# Patient Record
Sex: MALE | Race: WHITE
[De-identification: names, ages, dates, MRNs, and addresses within clinical notes are randomized per-mention and may not be internally consistent; named-entity substitution may affect disease eponyms.]

---

## 2018-04-30 ENCOUNTER — HOSPITAL ENCOUNTER (OUTPATIENT)
Dept: HOSPITAL 17 - NEPE | Age: 63
Setting detail: OBSERVATION
LOS: 1 days | Discharge: HOME | End: 2018-05-01
Attending: INTERNAL MEDICINE | Admitting: INTERNAL MEDICINE
Payer: COMMERCIAL

## 2018-04-30 VITALS
RESPIRATION RATE: 17 BRPM | DIASTOLIC BLOOD PRESSURE: 61 MMHG | HEART RATE: 70 BPM | SYSTOLIC BLOOD PRESSURE: 112 MMHG | OXYGEN SATURATION: 95 %

## 2018-04-30 VITALS — OXYGEN SATURATION: 97 %

## 2018-04-30 VITALS
HEART RATE: 68 BPM | SYSTOLIC BLOOD PRESSURE: 158 MMHG | DIASTOLIC BLOOD PRESSURE: 57 MMHG | OXYGEN SATURATION: 96 % | RESPIRATION RATE: 21 BRPM

## 2018-04-30 VITALS
SYSTOLIC BLOOD PRESSURE: 136 MMHG | OXYGEN SATURATION: 98 % | RESPIRATION RATE: 15 BRPM | TEMPERATURE: 97.4 F | DIASTOLIC BLOOD PRESSURE: 80 MMHG | HEART RATE: 65 BPM

## 2018-04-30 VITALS
OXYGEN SATURATION: 96 % | TEMPERATURE: 97.6 F | HEART RATE: 72 BPM | RESPIRATION RATE: 22 BRPM | SYSTOLIC BLOOD PRESSURE: 104 MMHG | DIASTOLIC BLOOD PRESSURE: 62 MMHG

## 2018-04-30 VITALS
RESPIRATION RATE: 24 BRPM | HEART RATE: 72 BPM | DIASTOLIC BLOOD PRESSURE: 67 MMHG | OXYGEN SATURATION: 94 % | SYSTOLIC BLOOD PRESSURE: 125 MMHG

## 2018-04-30 VITALS — WEIGHT: 216.05 LBS | BODY MASS INDEX: 26.86 KG/M2 | HEIGHT: 75 IN

## 2018-04-30 VITALS
OXYGEN SATURATION: 96 % | HEART RATE: 63 BPM | DIASTOLIC BLOOD PRESSURE: 58 MMHG | TEMPERATURE: 98.3 F | SYSTOLIC BLOOD PRESSURE: 108 MMHG | RESPIRATION RATE: 15 BRPM

## 2018-04-30 VITALS — OXYGEN SATURATION: 94 %

## 2018-04-30 VITALS
DIASTOLIC BLOOD PRESSURE: 66 MMHG | HEART RATE: 73 BPM | OXYGEN SATURATION: 94 % | SYSTOLIC BLOOD PRESSURE: 125 MMHG | RESPIRATION RATE: 20 BRPM

## 2018-04-30 VITALS
HEART RATE: 70 BPM | DIASTOLIC BLOOD PRESSURE: 72 MMHG | SYSTOLIC BLOOD PRESSURE: 139 MMHG | OXYGEN SATURATION: 96 % | RESPIRATION RATE: 15 BRPM

## 2018-04-30 VITALS — HEART RATE: 65 BPM

## 2018-04-30 VITALS — HEART RATE: 60 BPM

## 2018-04-30 VITALS
TEMPERATURE: 97.9 F | RESPIRATION RATE: 24 BRPM | SYSTOLIC BLOOD PRESSURE: 123 MMHG | DIASTOLIC BLOOD PRESSURE: 73 MMHG | OXYGEN SATURATION: 97 % | HEART RATE: 66 BPM

## 2018-04-30 VITALS — OXYGEN SATURATION: 98 %

## 2018-04-30 DIAGNOSIS — E11.40: ICD-10-CM

## 2018-04-30 DIAGNOSIS — R07.89: Primary | ICD-10-CM

## 2018-04-30 DIAGNOSIS — J45.909: ICD-10-CM

## 2018-04-30 DIAGNOSIS — Z79.02: ICD-10-CM

## 2018-04-30 DIAGNOSIS — K76.0: ICD-10-CM

## 2018-04-30 DIAGNOSIS — I25.2: ICD-10-CM

## 2018-04-30 DIAGNOSIS — K21.9: ICD-10-CM

## 2018-04-30 DIAGNOSIS — I25.10: ICD-10-CM

## 2018-04-30 DIAGNOSIS — E78.5: ICD-10-CM

## 2018-04-30 DIAGNOSIS — Z95.5: ICD-10-CM

## 2018-04-30 DIAGNOSIS — Z87.891: ICD-10-CM

## 2018-04-30 DIAGNOSIS — Z82.49: ICD-10-CM

## 2018-04-30 DIAGNOSIS — Z85.828: ICD-10-CM

## 2018-04-30 DIAGNOSIS — Z79.84: ICD-10-CM

## 2018-04-30 DIAGNOSIS — E03.9: ICD-10-CM

## 2018-04-30 DIAGNOSIS — M19.90: ICD-10-CM

## 2018-04-30 DIAGNOSIS — Z79.899: ICD-10-CM

## 2018-04-30 DIAGNOSIS — I10: ICD-10-CM

## 2018-04-30 DIAGNOSIS — I44.4: ICD-10-CM

## 2018-04-30 LAB
ALBUMIN SERPL-MCNC: 4 GM/DL (ref 3.4–5)
ALP SERPL-CCNC: 55 U/L (ref 45–117)
ALT SERPL-CCNC: 34 U/L (ref 12–78)
AST SERPL-CCNC: 23 U/L (ref 15–37)
BASOPHILS # BLD AUTO: 0.1 TH/MM3 (ref 0–0.2)
BASOPHILS NFR BLD: 0.8 % (ref 0–2)
BILIRUB SERPL-MCNC: 0.5 MG/DL (ref 0.2–1)
BUN SERPL-MCNC: 26 MG/DL (ref 7–18)
CALCIUM SERPL-MCNC: 9.1 MG/DL (ref 8.5–10.1)
CHLORIDE SERPL-SCNC: 108 MEQ/L (ref 98–107)
CREAT SERPL-MCNC: 1.39 MG/DL (ref 0.6–1.3)
EOSINOPHIL # BLD: 0.3 TH/MM3 (ref 0–0.4)
EOSINOPHIL NFR BLD: 3.5 % (ref 0–4)
ERYTHROCYTE [DISTWIDTH] IN BLOOD BY AUTOMATED COUNT: 13.8 % (ref 11.6–17.2)
GFR SERPLBLD BASED ON 1.73 SQ M-ARVRAT: 52 ML/MIN (ref 89–?)
GLUCOSE SERPL-MCNC: 220 MG/DL (ref 74–106)
HCO3 BLD-SCNC: 23.4 MEQ/L (ref 21–32)
HCT VFR BLD CALC: 45.4 % (ref 39–51)
HGB BLD-MCNC: 15.4 GM/DL (ref 13–17)
INR PPP: 1.1 RATIO
LYMPHOCYTES # BLD AUTO: 2 TH/MM3 (ref 1–4.8)
LYMPHOCYTES NFR BLD AUTO: 27 % (ref 9–44)
MAGNESIUM SERPL-MCNC: 2.2 MG/DL (ref 1.5–2.5)
MCH RBC QN AUTO: 31.3 PG (ref 27–34)
MCHC RBC AUTO-ENTMCNC: 34 % (ref 32–36)
MCV RBC AUTO: 92.1 FL (ref 80–100)
MONOCYTE #: 0.6 TH/MM3 (ref 0–0.9)
MONOCYTES NFR BLD: 8.9 % (ref 0–8)
NEUTROPHILS # BLD AUTO: 4.3 TH/MM3 (ref 1.8–7.7)
NEUTROPHILS NFR BLD AUTO: 59.8 % (ref 16–70)
PLATELET # BLD: 160 TH/MM3 (ref 150–450)
PMV BLD AUTO: 8.3 FL (ref 7–11)
PROT SERPL-MCNC: 7.7 GM/DL (ref 6.4–8.2)
PROTHROMBIN TIME: 10.8 SEC (ref 9.8–11.6)
RBC # BLD AUTO: 4.93 MIL/MM3 (ref 4.5–5.9)
SODIUM SERPL-SCNC: 141 MEQ/L (ref 136–145)
TROPONIN I SERPL-MCNC: (no result) NG/ML (ref 0.02–0.05)
WBC # BLD AUTO: 7.2 TH/MM3 (ref 4–11)

## 2018-04-30 PROCEDURE — 85730 THROMBOPLASTIN TIME PARTIAL: CPT

## 2018-04-30 PROCEDURE — 93017 CV STRESS TEST TRACING ONLY: CPT

## 2018-04-30 PROCEDURE — 82948 REAGENT STRIP/BLOOD GLUCOSE: CPT

## 2018-04-30 PROCEDURE — 80053 COMPREHEN METABOLIC PANEL: CPT

## 2018-04-30 PROCEDURE — A9502 TC99M TETROFOSMIN: HCPCS

## 2018-04-30 PROCEDURE — 85610 PROTHROMBIN TIME: CPT

## 2018-04-30 PROCEDURE — 78452 HT MUSCLE IMAGE SPECT MULT: CPT

## 2018-04-30 PROCEDURE — 71046 X-RAY EXAM CHEST 2 VIEWS: CPT

## 2018-04-30 PROCEDURE — G0378 HOSPITAL OBSERVATION PER HR: HCPCS

## 2018-04-30 PROCEDURE — 93005 ELECTROCARDIOGRAM TRACING: CPT

## 2018-04-30 PROCEDURE — 83690 ASSAY OF LIPASE: CPT

## 2018-04-30 PROCEDURE — 85025 COMPLETE CBC W/AUTO DIFF WBC: CPT

## 2018-04-30 PROCEDURE — 99285 EMERGENCY DEPT VISIT HI MDM: CPT

## 2018-04-30 PROCEDURE — 83735 ASSAY OF MAGNESIUM: CPT

## 2018-04-30 PROCEDURE — 84484 ASSAY OF TROPONIN QUANT: CPT

## 2018-04-30 PROCEDURE — 82550 ASSAY OF CK (CPK): CPT

## 2018-04-30 RX ADMIN — LOSARTAN POTASSIUM SCH MG: 25 TABLET, FILM COATED ORAL at 21:07

## 2018-04-30 RX ADMIN — GABAPENTIN SCH MG: 300 CAPSULE ORAL at 21:07

## 2018-04-30 RX ADMIN — INSULIN ASPART SCH: 100 INJECTION, SOLUTION INTRAVENOUS; SUBCUTANEOUS at 21:14

## 2018-04-30 RX ADMIN — METOPROLOL TARTRATE SCH MG: 25 TABLET, FILM COATED ORAL at 16:41

## 2018-04-30 RX ADMIN — Medication SCH ML: at 21:07

## 2018-04-30 RX ADMIN — INSULIN ASPART SCH: 100 INJECTION, SOLUTION INTRAVENOUS; SUBCUTANEOUS at 17:00

## 2018-04-30 RX ADMIN — NITROGLYCERIN SCH INCH: 20 OINTMENT TOPICAL at 18:00

## 2018-04-30 RX ADMIN — METOPROLOL TARTRATE SCH MG: 25 TABLET, FILM COATED ORAL at 21:00

## 2018-04-30 RX ADMIN — NITROGLYCERIN SCH INCH: 20 OINTMENT TOPICAL at 16:41

## 2018-04-30 NOTE — EKG
Date Performed: 04/30/2018       Time Performed: 16:44:32

 

PTAGE:      63 years

 

EKG:      Sinus rhythm 

 

 WITH SINUS ARRHYTHMIA MARKED LEFT AXIS DEVIATION LATERAL MYOCARDIAL INFARCTION ABNORMAL ECG Compared
 to prior electrocardiogram, Ectopic atrial rhythm no longer present. 

 

 PREVIOUS TRACING            : 04/30/2018 14.25

 

DOCTOR:   Felipe Valdez  Interpretating Date/Time  04/30/2018 17:28:21

## 2018-04-30 NOTE — EKG
Date Performed: 04/30/2018       Time Performed: 10:23:13

 

PTAGE:      63 years

 

EKG:      Sinus rhythm 

 

 WITH OCCASIONAL SUPRAVENTRICULAR PREMATURE COMPLEXES LEFT ANTERIOR FASCICULAR BLOCK ANTEROLATERAL MY
OCARDIAL INFARCTION ABNORMAL ECG INTERPRETATION BASED ON A DEFAULT AGE OF 40 YEARS 

 

 PREVIOUS TRACING            : 08/13/2014 07.03 Since the previous tracing, no significant change not
ed

 

DOCTOR:   Bryan Morris  Interpretating Date/Time  04/30/2018 20:33:54

## 2018-04-30 NOTE — PD
HPI


Chief Complaint:  Chest Pain


Time Seen by Provider:  10:28


Travel History


International Travel<30 days:  No


Contact w/Intl Traveler<30days:  No


Traveled to known affect area:  No





History of Present Illness


HPI


The patient 63 years old and arrives to the ER with a complaint of chest pain.  

It started about 5 minutes after he woke up.  Location is retrosternal and 

epigastric.  At its worst the pain was 6/10.  In the ER it is almost completely 

gone.  Patient reports a history of myocardial infarction 2, in 2001 and 2005.

  He reports 6/10 pain severity.  He reports taking Plavix daily and an aspirin 

allergy.  He reports no shortness of breath.  There is some radiation to the 

head into the back.  He states it feels similar to his second MI.  He denies 

any travel recently.  The patient works 7 PM to 7 AM and just finished 3 nights 

in a row.  Positive history of myocardial infarction involving father and 

brother.





PFSH


Past Medical History


Hx Anticoagulant Therapy:  Yes (PLAVIX)


Arthritis:  Yes


Asthma:  Yes


Blood Disorders:  No


Anxiety:  No


Depression:  No


Heart Rhythm Problems:  No


Cancer:  Yes (SKIN)


Cardiac Catheterization:  Yes


Cardiovascular Problems:  Yes


High Cholesterol:  Yes


Chemotherapy:  No


Chest Pain:  Yes


Congestive Heart Failure:  No


Coronary Artery Disease:  Yes


Diabetes:  Yes


Patient Takes Glucophage:  Yes


Diminished Hearing:  No


Diverticulitis:  Yes


Endocrine:  Yes


Gastrointestinal Disorders:  Yes (POSSIBLE GERD, INDIGESTION D/T GALLBLADDER, 

NAUSEA)


GERD:  Yes


Glaucoma:  No


Gout:  Yes


Genitourinary:  No


Hepatitis:  No


Hiatal Hernia:  No


Hypertension:  Yes


Immune Disorder:  No


Medical other:  Yes (FATTY LIVER)


Musculoskeletal:  Yes (ARTHRITIS, SEES CHIROPRACTOR FOR BACK ISSUES)


Neurologic:  Yes (NEUROPATHY BHANU FEET)


Psychiatric:  No


Reproductive:  No


Respiratory:  Yes


Integumentary:  No


Myocardial Infarction:  Yes


Radiation Therapy:  No


Thyroid Disease:  Yes





Past Surgical History


Abdominal Surgery:  Yes (LIVER BIOPSY)


AICD:  No


Body Medical Devices:  CARDIAC STENTS X6


Cardiac Surgery:  Yes (STENTS PLACED 2001,2005,2008)


Cholecystectomy:  Yes


Coronary Artery Bypass Graft:  No


Coronary Stent:  Yes (X6)


Ear Surgery:  No


Endocrine Surgery:  No


Eye Surgery:  No


Genitourinary Surgery:  No


Gynecologic Surgery:  No


Joint Replacement:  No


Neurologic Surgery:  Yes (COLONOSCOPY ; EGD X 2)


Oral Surgery:  No


Pacemaker:  No


Thoracic Surgery:  No


Other Surgery:  Yes (ARTHROSCOPY RIGHT KNEE, LIVER BIOPSY, 6 CARDIAC STENTS)





Family History


Family Myocardial Infarction:  Yes (father)





Social History


Alcohol Use:  Yes (OCCASSIONAL)


Tobacco Use:  No


Substance Use:  No





Allergies-Medications


(Allergen,Severity, Reaction):  


Coded Allergies:  


     diclofenac (Unverified  Allergy, Severe, FACIAL SWELLING, 4/30/18)


     etodolac (Unverified  Allergy, Severe, FACIAL SWELLING, 4/30/18)


     flurbiprofen (Unverified  Allergy, Severe, FACIAL SWELLING, 4/30/18)


     ibuprofen (Unverified  Allergy, Severe, FACIAL SWELLING, 4/30/18)


     indomethacin (Unverified  Allergy, Severe, FACIAL SWELLING, 4/30/18)


     ketoprofen (Unverified  Allergy, Severe, FACIAL SWELLING, 4/30/18)


     ketorolac (Unverified  Allergy, Severe, FACIAL SWELLING, 4/30/18)


     naproxen (Unverified  Allergy, Severe, FACIAL SWELLING, 4/30/18)


     oxaprozin (Unverified  Allergy, Severe, FACIAL SWELLING, 4/30/18)


     penicillin G (Unverified  Allergy, Severe, MOUTH AND THROAT SORES, 4/30/18)


Reported Meds & Prescriptions





Reported Meds & Active Scripts


Active


Reported


Osteo Bi-Flex One A Day (Boswellia-Glucosamine-Vitamin) 1 Tab 1 Tab PO DAILY


Centrum (Multiple Vitamins W/ Minerals) 1 Chew 1 Tab CHEW HS


Tresiba Flextouch Pen Inj (Insulin Degludec Inj) 300 unit/3 ML Pen 40 Units SQ 

AS DIRECTED


Rosuvastatin (Rosuvastatin Calcium) 40 Mg Tab 40 Mg PO HS


Pantoprazole (Pantoprazole Sodium) 40 Mg Tab 40 Mg PO DAILY


Metformin (Metformin HCl) 1,000 Mg Tab 1,000 Mg PO BIDPC


Losartan (Losartan Potassium) 25 Mg Tab 25 Mg PO BID


Levothyroxine (Levothyroxine Sodium) 75 Mcg Tab 75 Mcg PO DAILY


Gabapentin 300 Mg Cap 300 Mg PO BID


Folic Acid 1 Mg Tablet 1 Mg PO DAILY


Farxiga (Dapagliflozin) 5 Mg Tab 5 Mg PO DAILY


Plavix (Clopidogrel Bisulfate) 75 Mg Tab 75 Mg PO HS








Review of Systems


Except as stated in HPI:  all other systems reviewed are Neg


General / Constitutional:  No: Fever


Eyes:  No: Photophobia





Physical Exam


Narrative


GENERAL: 63-year-old male well-nourished well-developed no acute distress





Vital Signs








  Date Time  Temp Pulse Resp B/P (MAP) Pulse Ox O2 Delivery O2 Flow Rate FiO2


 


4/30/18 10:34  73 20 125/67 (86) 94 Room Air  





    119/66 (83)    


 


4/30/18 10:33  72 24 125/67 (86) 94 Room Air  


 


4/30/18 10:32     94 Room Air  


 


4/30/18 10:32      Nasal Cannula 2.00 


 


4/30/18 10:12 97.6 72 22 104/62 (76) 96   








SKIN: Warm and dry.


HEAD: Atraumatic. Normocephalic. 


EYES: Pupils equal and round. No scleral icterus. No injection or drainage. 


ENT: No nasal bleeding or discharge.  Mucous membranes pink and moist.


NECK: Trachea midline. No JVD. 


CARDIOVASCULAR: Regular rate and rhythm.  


RESPIRATORY: No accessory muscle use. Clear to auscultation. Breath sounds 

equal bilaterally. 


GASTROINTESTINAL: Abdomen soft, non-tender, nondistended. Hepatic and splenic 

margins not palpable. 


MUSCULOSKELETAL: Extremities without clubbing, cyanosis, or edema. No obvious 

deformities. 


NEUROLOGICAL: Awake and alert. No obvious cranial nerve deficits.  Motor 

grossly within normal limits. Five out of 5 muscle strength in the arms and 

legs.  Normal speech.


PSYCHIATRIC: Appropriate mood and affect; insight and judgment normal.





Data


Data


Last Documented VS





Vital Signs








  Date Time  Temp Pulse Resp B/P (MAP) Pulse Ox O2 Delivery O2 Flow Rate FiO2


 


4/30/18 10:34  73 20 125/67 (86) 94 Room Air  





    119/66 (83)    


 


4/30/18 10:32       2.00 


 


4/30/18 10:12 97.6       








Orders





 Orders


Electrocardiogram (4/30/18 10:28)


Ckmb (Isoenzyme) Profile (4/30/18 10:28)


Complete Blood Count With Diff (4/30/18 10:28)


Comprehensive Metabolic Panel (4/30/18 10:28)


Magnesium (Mg) (4/30/18 10:28)


Prothrombin Time / Inr (Pt) (4/30/18 10:28)


Act Partial Throm Time (Ptt) (4/30/18 10:28)


Troponin I (4/30/18 10:28)


Lipase (4/30/18 10:28)


Ecg Monitoring (4/30/18 10:28)


Bilateral Bp Monitoring (4/30/18 10:28)


Iv Access Insert/Monitor (4/30/18 10:28)


Oximetry (4/30/18 10:28)


Oxygen Administration (4/30/18 10:28)


Sodium Chloride 0.9% Flush (Ns Flush) (4/30/18 10:30)


Chest, Pa & Lat (4/30/18 10:28)


Activity Bed Rest With Brp (4/30/18 12:08)


Vital Signs (Adult) Q4H (4/30/18 12:08)


Cardiac Rhythm .As Directed (4/30/18 12:08)


Notify Dr: Other .PRN (4/30/18 12:08)


Notify Dr. Parameters (4/30/18 12:08)


Resp Oxygen Nasal Cannula (4/30/18 )


Diet Npo (4/30/18 Lunch)


Ckmb (Isoenzyme) Profile (4/30/18 12:08)


Ckmb (Isoenzyme) Profile (4/30/18 15:08)


Troponin I (4/30/18 12:08)


Troponin I (4/30/18 15:08)


Electrocardiogram (4/30/18 12:08)


Electrocardiogram (4/30/18 15:08)


^ Obtain (4/30/18 12:08)


Sodium Chloride 0.9% Flush (Ns Flush) (4/30/18 12:15)


Sodium Chloride 0.9% Flush (Ns Flush) (4/30/18 21:00)


Acetaminophen (Tylenol) (4/30/18 12:15)


Acetamin-Hydrocod 325-7.5 Mg (Norco  7.5 (4/30/18 12:15)


Morphine Inj (Morphine Inj) (4/30/18 12:15)


Ondansetron Inj (Zofran Inj) (4/30/18 12:15)


Cardiac Monitor / Telemetry MARINO.Q8H (4/30/18 12:08)





Labs





Laboratory Tests








Test


  4/30/18


10:30


 


White Blood Count 7.2 TH/MM3 


 


Red Blood Count 4.93 MIL/MM3 


 


Hemoglobin 15.4 GM/DL 


 


Hematocrit 45.4 % 


 


Mean Corpuscular Volume 92.1 FL 


 


Mean Corpuscular Hemoglobin 31.3 PG 


 


Mean Corpuscular Hemoglobin


Concent 34.0 % 


 


 


Red Cell Distribution Width 13.8 % 


 


Platelet Count 160 TH/MM3 


 


Mean Platelet Volume 8.3 FL 


 


Neutrophils (%) (Auto) 59.8 % 


 


Lymphocytes (%) (Auto) 27.0 % 


 


Monocytes (%) (Auto) 8.9 % 


 


Eosinophils (%) (Auto) 3.5 % 


 


Basophils (%) (Auto) 0.8 % 


 


Neutrophils # (Auto) 4.3 TH/MM3 


 


Lymphocytes # (Auto) 2.0 TH/MM3 


 


Monocytes # (Auto) 0.6 TH/MM3 


 


Eosinophils # (Auto) 0.3 TH/MM3 


 


Basophils # (Auto) 0.1 TH/MM3 


 


CBC Comment DIFF FINAL 


 


Differential Comment  


 


Prothrombin Time 10.8 SEC 


 


Prothromb Time International


Ratio 1.1 RATIO 


 


 


Activated Partial


Thromboplast Time 25.0 SEC 


 


 


Blood Urea Nitrogen 26 MG/DL 


 


Creatinine 1.39 MG/DL 


 


Random Glucose 220 MG/DL 


 


Total Protein 7.7 GM/DL 


 


Albumin 4.0 GM/DL 


 


Calcium Level 9.1 MG/DL 


 


Magnesium Level 2.2 MG/DL 


 


Alkaline Phosphatase 55 U/L 


 


Aspartate Amino Transf


(AST/SGOT) 23 U/L 


 


 


Alanine Aminotransferase


(ALT/SGPT) 34 U/L 


 


 


Total Bilirubin 0.5 MG/DL 


 


Sodium Level 141 MEQ/L 


 


Potassium Level 4.3 MEQ/L 


 


Chloride Level 108 MEQ/L 


 


Carbon Dioxide Level 23.4 MEQ/L 


 


Anion Gap 10 MEQ/L 


 


Estimat Glomerular Filtration


Rate 52 ML/MIN 


 


 


Total Creatine Kinase 60 U/L 


 


Troponin I


  LESS THAN 0.02


NG/ML


 


Lipase 128 U/L 











MDM


Medical Decision Making


Medical Screen Exam Complete:  Yes


Emergency Medical Condition:  Yes


Medical Record Reviewed:  Yes


Differential Diagnosis


NSTEMI, unstable angina, coronary vasospasm, PE, PTX, aortic dissection, 

pericarditis, myocarditis, endocarditis, PNA, esophageal disease, aneurysm, 

musculoskeletal etiologies, anxiety, cocaine/sympathomimetic abuse


Narrative Course


EKG shows a sinus rhythm at a rate of 75 left anterior fascicular block





CBC & BMP Diagram


4/30/18 10:30








Total Protein 7.7, Albumin 4.0, Calcium Level 9.1, Magnesium Level 2.2, 

Alkaline Phosphatase 55, Aspartate Amino Transf (AST/SGOT) 23, Alanine 

Aminotransferase (ALT/SGPT) 34, Total Bilirubin 0.5





Troponin is less than 0.02


The patient underwent a stress test about 6 months ago revealing inferolateral 

wall hypokinesis and an EF of 35% with an intermediate annual mortality risk.





Results discussed with patient at 11:30 AM.  Call placed to Dr. Mckeon at 

1135AM.





Dr Mckeon recommends [facility] admission. Pt amenable with plan.





Diagnosis





 Primary Impression:  


 Chest pain


 Qualified Codes:  R07.9 - Chest pain, unspecified





Admitting Information


Admitting Physician Requests:  Observation











Sacha Mcintyre MD Apr 30, 2018 10:58

## 2018-04-30 NOTE — HHI.HP
HPI


Primary Care Physician


Teresa Rogel MD


Chief Complaint


Chest pain


History of Present Illness


63-year-old male with history of coronary artery disease, 6 cardiac stents, 

hypertension, hyperlipidemia, and type 2 diabetes presents emergency room for 

further evaluation of chest pain.  Onset 8 AM.  Location substernal.  

Characterized as pressure.  Severity mild.  Radiation to back.  Duration 30 

minutes.  No associated symptoms of nausea, vomiting, dyspnea, or diaphoresis.  

No known precipitating or relieving factors.  Experienced second episode 

proximately 15 minutes after first episodes subsided. Duration of second 

episode one hour. Third episode occurred around 1000 while leaving from 

chiropractor's office.  Third episode he did not experience any radiation.  He 

notified is wife, who wanted to drive him to ER however patient requested to 

stop at his cardiologist office first. Cardiologist office directed him to ER 

for further evaluation and provided wife with recent cardiac testing and office 

visit note. Reports today discomfort reminded him of discomfort with second MI 

in . Currently chest pain free. No recent illness, injury, or travel.





Review of Systems


General: No fatigue,weakness, fever, chills, recent illness, or change in 

appetite.  Has been in his general state of health.


HEENT: No HA, no vision changes, no nasal congestion or drainage, no dysphasia


CV: As stated above.  No current chest pain, pressure, or tightness.  


RESP: No SOB, cough, or wheeze


GI: No nausea, vomiting, bowel changes, diarrhea, constipation, pain, distention

, melena, or blood in the stool. 


: No dysuria, urgency, frequency


EXT: No dependent edema, no claudication


MS: No discomfort, injury, trauma, or change in ROM


NEURO: No change in memory, difficulty with balance, LOC, motor/sensory deficits


PSYCH: No anxiety, depression


SKIN: No rashes, no concerning lesions





Past Family Social History


Allergies:  


Coded Allergies:  


     diclofenac (Unverified  Allergy, Severe, FACIAL SWELLING, 18)


     etodolac (Unverified  Allergy, Severe, FACIAL SWELLING, 18)


     flurbiprofen (Unverified  Allergy, Severe, FACIAL SWELLING, 18)


     ibuprofen (Unverified  Allergy, Severe, FACIAL SWELLING, 18)


     indomethacin (Unverified  Allergy, Severe, FACIAL SWELLING, 18)


     ketoprofen (Unverified  Allergy, Severe, FACIAL SWELLING, 18)


     ketorolac (Unverified  Allergy, Severe, FACIAL SWELLING, 18)


     naproxen (Unverified  Allergy, Severe, FACIAL SWELLING, 18)


     oxaprozin (Unverified  Allergy, Severe, FACIAL SWELLING, 18)


     penicillin G (Unverified  Allergy, Severe, MOUTH AND THROAT SORES, 18)


Past Medical History


Ischemic coronary disease, 6 cardiac stents, MI x2 ( and ) hypertension

, hyperlipidemia, type 2 diabetes, GERD, hypothyroidism,


Past Surgical History


Cholecystectomy, right knee arthroscopy, [employer] tendon repair


Reported Medications





Reported Meds & Active Scripts


Active


Reported


Osteo Bi-Flex One A Day (Boswellia-Glucosamine-Vitamin) 1 Tab 1 Tab PO DAILY


Centrum (Multiple Vitamins W/ Minerals) 1 Chew 1 Tab CHEW HS


Tresiba Flextouch Pen Inj (Insulin Degludec Inj) 300 unit/3 ML Pen 40 Units SQ 

AS DIRECTED


Rosuvastatin (Rosuvastatin Calcium) 40 Mg Tab 40 Mg PO HS


Pantoprazole (Pantoprazole Sodium) 40 Mg Tab 40 Mg PO DAILY


Metformin (Metformin HCl) 1,000 Mg Tab 1,000 Mg PO BIDPC


Losartan (Losartan Potassium) 25 Mg Tab 25 Mg PO BID


Levothyroxine (Levothyroxine Sodium) 75 Mcg Tab 75 Mcg PO DAILY


Gabapentin 300 Mg Cap 300 Mg PO BID


Folic Acid 1 Mg Tablet 1 Mg PO DAILY


Farxiga (Dapagliflozin) 5 Mg Tab 5 Mg PO DAILY


Plavix (Clopidogrel Bisulfate) 75 Mg Tab 75 Mg PO HS


Active Ordered Medications





Current Medications








 Medications


  (Trade)  Dose


 Ordered  Sig/Nahum


 Route  Start Time


 Stop Time Status Last Admin


 


  (NS Flush)  2 ml  UNSCH  PRN


 IVF  18 10:30


     


 


 


  (NS Flush)  2 ml  UNSCH  PRN


 IV FLUSH  18 12:15


     


 


 


  (NS Flush)  2 ml  BID


 IV FLUSH  18 21:00


     


 


 


  (Tylenol)  500 mg  Q4H  PRN


 PO  18 12:15


     


 


 


  (Norco  7.5-325


 Mg)  1 tab  Q4H  PRN


 PO  18 12:15


     


 


 


  (Morphine Inj)  2 mg  Q4H  PRN


 IV PUSH  18 12:15


     


 


 


  (Zofran Inj)  4 mg  Q6H  PRN


 IV PUSH  18 12:15


     


 








Family History


Brother  age 45 myocardial infarction.  Father CABG age 59.


Social History


Known coronary artery disease, diabetes, hypertension, hyperlipidemia.


Former smoker quitting over 35 years ago.  


.  Works for [employer].





Past cardiac testing


Recent Nuclear stress testing 6 months ago (wife has report at bedside)-severe 

infarction of the inferolateral wall.  Left ventricular function is moderately 

reduced.  EF of 39%.


Patient's cardiologist is Dr. Gipson.  Followed for many years with Dr. Casey Arciniega however due to insurance change switched to Dr. Gipson 

office. 


Reports x6 cardiac stents: After reviewing medical records stents have been 

placed to Proximal LAD, x3 circumflex, mid RCA, and radius intermedius, 


14 Cardiac catheterization (Dr. Arciniega) Conclusions: Continued patency 

of the stents in the LAD, ramus, and left circumflex artery.  It is felt that 

his chest pain is not due to ischemic heart disease.


13 Cardiac catheterization (Dr. Arciniega) Conclusions: 1. Mild left 

ventricular dysfunction. 2.  Patent stents to LAD and circumflex with no 

significant obstructive coronary disease noted.





Physical Exam


Vital Signs





Vital Signs








  Date Time  Temp Pulse Resp B/P (MAP) Pulse Ox O2 Delivery O2 Flow Rate FiO2


 


18 12:58        


 


18 12:19     98 Nasal Cannula 2.00 


 


18 12:00  68 21 158/57 (90) 96 Room Air  


 


18 11:30  70 15 139/72 (94) 96 Room Air  


 


18 11:00  70 17 112/61 (78) 95 Room Air  


 


18 10:34  73 20 125/67 (86) 94 Room Air  





    119/66 (83)    


 


18 10:33  72 24 125/67 (86) 94 Room Air  


 


18 10:32     94 Room Air  


 


18 10:32      Nasal Cannula 2.00 


 


18 10:12 97.6 72 22 104/62 (76) 96   








Physical Exam


GENERAL: Alert WN, WD, NAD, pleasant,  male


HEAD: NC, AT


EYES: Sclera clear, conjunctiva without injection, pupils equal and round


ENT: Mucous membranes pink and moist


NECK: Supple, no masses, trachea midline


CV: RRR, without murmur, rub, gallop, no JVD, S1-S2 no S3-S4.  No carotid 

bruits.  Chest wall nontender with palpation.


RESP: Clear lungs throughout bilateral, no crackles, wheeze, rhonchi, 

symmetrical chest rise, nonlabored, able to speak in full sentences


ABD: Soft, NT, ND, no masses, positive bowel tones


BACK: No scoliosis


EXT: Pulses +2x4, no dependent edema


MS: Normal tone x4 extremities, no obvious deformities, full range of motion


NEURO: CN II through CN XII grossly intact, motor strength 5/5, gait WNL


PSYCH: A+O x3, pleasant affect, appropriate speech, mood, insight and judgment


SKIN: Normal turgor, normal texture, no lesions, no rashes, brisk cap refill, 

even hair distribution


Laboratory





Laboratory Tests








Test


  18


10:30


 


White Blood Count 7.2 


 


Red Blood Count 4.93 


 


Hemoglobin 15.4 


 


Hematocrit 45.4 


 


Mean Corpuscular Volume 92.1 


 


Mean Corpuscular Hemoglobin 31.3 


 


Mean Corpuscular Hemoglobin


Concent 34.0 


 


 


Red Cell Distribution Width 13.8 


 


Platelet Count 160 


 


Mean Platelet Volume 8.3 


 


Neutrophils (%) (Auto) 59.8 


 


Lymphocytes (%) (Auto) 27.0 


 


Monocytes (%) (Auto) 8.9 


 


Eosinophils (%) (Auto) 3.5 


 


Basophils (%) (Auto) 0.8 


 


Neutrophils # (Auto) 4.3 


 


Lymphocytes # (Auto) 2.0 


 


Monocytes # (Auto) 0.6 


 


Eosinophils # (Auto) 0.3 


 


Basophils # (Auto) 0.1 


 


CBC Comment DIFF FINAL 


 


Differential Comment  


 


Prothrombin Time 10.8 


 


Prothromb Time International


Ratio 1.1 


 


 


Activated Partial


Thromboplast Time 25.0 


 


 


Blood Urea Nitrogen 26 


 


Creatinine 1.39 


 


Random Glucose 220 


 


Total Protein 7.7 


 


Albumin 4.0 


 


Calcium Level 9.1 


 


Magnesium Level 2.2 


 


Alkaline Phosphatase 55 


 


Aspartate Amino Transf


(AST/SGOT) 23 


 


 


Alanine Aminotransferase


(ALT/SGPT) 34 


 


 


Total Bilirubin 0.5 


 


Sodium Level 141 


 


Potassium Level 4.3 


 


Chloride Level 108 


 


Carbon Dioxide Level 23.4 


 


Anion Gap 10 


 


Estimat Glomerular Filtration


Rate 52 


 


 


Total Creatine Kinase 60 


 


Troponin I LESS THAN 0.02 


 


Lipase 128 








Result Diagram:  


18 1030                                                                   

             18 1030





Imaging





Last 48 hours Impressions








Chest X-Ray 18 1028 Signed





Impressions: 





 Service Date/Time:  2018 11:19 - CONCLUSION:  No acute 





 cardiopulmonary disease.     Richard Johnson MD 








Course


EKG


1st EKG-Normal sinus rhythm, no ST-T segment changes, PVC


2nd EKG-normal sinus rhythm, no ST changes, nonspecific T-wave changes, Q waves 

inferiorly





Caprini VTE Risk Assessment


Caprini VTE Risk Assessment:  Mod/High Risk (score >= 2)


Caprini Risk Assessment Model











 Point Value = 1          Point Value = 2  Point Value = 3  Point Value = 5


 


Age 41-60


Minor surgery


BMI > 25 kg/m2


Swollen legs


Varicose veins


Pregnancy or postpartum


History of unexplained or recurrent


   spontaneous 


Oral contraceptives or hormone


   replacement


Sepsis (< 1 month)


Serious lung disease, including


   pneumonia (< 1 month)


Abnormal pulmonary function


Acute myocardial infarction


Congestive heart failure (< 1 month)


History of inflammatory bowel disease


Medical patient at bed rest Age 61-74


Arthroscopic surgery


Major open surgery (> 45 min)


Laparoscopic surgery (> 45 min)


Malignancy


Confined to bed (> 72 hours)


Immobilizing plaster cast


Central venous access Age >= 75


History of VTE


Family history of VTE


Factor V Leiden


Prothrombin 39923Y


Lupus anticoagulant


Anticardiolipin antibodies


Elevated serum homocysteine


Heparin-induced thrombocytopenia


Other congenital or acquired


   thrombophilia Stroke (< 1 month)


Elective arthroplasty


Hip, pelvis, or leg fracture


Acute spinal cord injury (< 1 month)








Prophylaxis Regimen











   Total Risk


Factor Score Risk Level Prophylaxis Regimen


 


0-1      Low Early ambulation


 


2 Moderate Order ONE of the following:


*Sequential Compression Device (SCD)


*Heparin 5000 units SQ BID


 


3-4 Higher Order ONE of the following medications:


*Heparin 5000 units SQ TID


*Enoxaparin/Lovenox 40 mg SQ daily (WT < 150 kg, CrCl > 30 mL/min)


*Enoxaparin/Lovenox 30 mg SQ daily (WT < 150 kg, CrCl > 10-29 mL/min)


*Enoxaparin/Lovenox 30 mg SQ BID (WT < 150 kg, CrCl > 30 mL/min)


AND/OR


*Sequential Compression Device (SCD)


 


5 or more Highest Order ONE of the following medications:


*Heparin 5000 units SQ TID (Preferred with Epidurals)


*Enoxaparin/Lovenox 40 mg SQ daily (WT < 150 kg, CrCl > 30 mL/min)


*Enoxaparin/Lovenox 30 mg SQ daily (WT < 150 kg, CrCl > 10-29 mL/min)


*Enoxaparin/Lovenox 30 mg SQ BID (WT < 150 kg, CrCl > 30 mL/min)


AND


*Sequential Compression Device (SCD)











Assessment and Plan


Assessment and Plan


#1 Chest pain-admitted to chest pain center.  Rule out with serial EKGs and 

cardiac enzymes.  Monitor on telemetry.  Will be seen and evaluated by Dr. Khadar Oliveira.  Discussed likely will repeat chemical stress testing in a.m. 

after being ruled out.  Dr. Gipson previously made aware patient's 

admission to chest pain center. Due to history of coronary artery disease and 

suspension chest discomfort may be cardiac related begin Nitro past 0.5" Q6H 

and metoprolol 12.5mg BID. Discussed with patient, wife, and RN plan of care. 


#2 History of diabetes-SSI moderate dose coverage, hold metformin, farxiga, and 

tresiba


#3 History of hyperlipidemia-continue rosuvastatin


#4 History of hypertension-continue Losartan


#5 History of GERD-continue Protonix











Lorena Christensen 2018 13:30

## 2018-04-30 NOTE — RADRPT
EXAM DATE/TIME:  04/30/2018 11:19 

 

[facility] COMPARISON:     

No previous studies available for comparison.

 

                     

INDICATIONS :     

Mid sternal chest pains x1 day. Prior heart attack, 6 stents.

                     

 

MEDICAL HISTORY :     

Myocardial infarction.  Hypertension        

 

SURGICAL HISTORY :     

Coronary artery stent.   

 

ENCOUNTER:     

Initial                                        

 

ACUITY:     

1 day      

 

PAIN SCORE:     

6/10

 

LOCATION:     

Bilateral chest 

 

FINDINGS:     

PA and lateral views of the chest demonstrate the lungs to be symmetrically aerated without evidence 
of mass, infiltrate or effusion.  The cardiomediastinal contours are unremarkable.  Osseous structure
s are intact.

 

CONCLUSION:     

No acute cardiopulmonary disease.

 

 

 

 Richard Johnson MD on April 30, 2018 at 11:26           

Board Certified Radiologist.

 This report was verified electronically.

## 2018-04-30 NOTE — EKG
Date Performed: 04/30/2018       Time Performed: 14:25:24

 

PTAGE:      63 years

 

EKG:      POSSIBLE Ectopic atrial rhythm BORDERLINE RIGHT AXIS DEVIATION PATTERN CONSISTENT WITH PULM
ONARY DISEASE POSSIBLE INFERIOR MYOCARDIAL INFARCTION ABNORMAL ECG Compared to prior electrocardiogra
m, POSSIBLE Ectopic atrial rhythm is now present 

 

 PREVIOUS TRACING            : 04/30/2018 10.23

 

DOCTOR:   Felipe Valdez  Interpretating Date/Time  04/30/2018 17:37:38

## 2018-05-01 VITALS — HEART RATE: 64 BPM

## 2018-05-01 VITALS
HEART RATE: 66 BPM | OXYGEN SATURATION: 96 % | RESPIRATION RATE: 16 BRPM | TEMPERATURE: 97.5 F | SYSTOLIC BLOOD PRESSURE: 137 MMHG | DIASTOLIC BLOOD PRESSURE: 75 MMHG

## 2018-05-01 VITALS
HEART RATE: 62 BPM | TEMPERATURE: 98.2 F | DIASTOLIC BLOOD PRESSURE: 65 MMHG | SYSTOLIC BLOOD PRESSURE: 135 MMHG | OXYGEN SATURATION: 94 % | RESPIRATION RATE: 18 BRPM

## 2018-05-01 VITALS — OXYGEN SATURATION: 97 %

## 2018-05-01 VITALS — HEART RATE: 62 BPM

## 2018-05-01 VITALS — HEART RATE: 80 BPM

## 2018-05-01 RX ADMIN — NITROGLYCERIN SCH INCH: 20 OINTMENT TOPICAL at 05:38

## 2018-05-01 RX ADMIN — METOPROLOL TARTRATE SCH MG: 25 TABLET, FILM COATED ORAL at 11:35

## 2018-05-01 RX ADMIN — GABAPENTIN SCH MG: 300 CAPSULE ORAL at 09:00

## 2018-05-01 RX ADMIN — Medication SCH ML: at 09:00

## 2018-05-01 RX ADMIN — LOSARTAN POTASSIUM SCH MG: 25 TABLET, FILM COATED ORAL at 09:00

## 2018-05-01 RX ADMIN — NITROGLYCERIN SCH INCH: 20 OINTMENT TOPICAL at 00:00

## 2018-05-01 RX ADMIN — INSULIN ASPART SCH: 100 INJECTION, SOLUTION INTRAVENOUS; SUBCUTANEOUS at 08:00

## 2018-05-01 NOTE — RADRPT
EXAM DATE/TIME:  05/01/2018 08:59 

 

[facility] COMPARISON:     

No previous studies available for comparison.

 

 

INDICATIONS :     

Retrosternal chest pain. Angina. Myocardial infarction.

                           

 

DOSE:     

26.8 mCi Tc99m Myoview at stress.

                     8.1 mCi Tc99m Myoview at rest.

                     0.4 mg Lexiscan

                       

 

 

STRESS SYMPTOMS:      

Short of breath.

                       

 

 

EJECTION FRACTION:       

46%

                       

 

MEDICAL HISTORY :     

Cardiovascular disease. Hypertension.  Asthma.

 

SURGICAL HISTORY :      

Coronary artery stent.   

 

ENCOUNTER:     

Initial

 

ACUITY:     

1 day

 

PAIN SCALE:     

5/10

 

LOCATION:      

Retrosternal chest 

 

TECHNIQUE:     

The patient underwent pharmacologic stress with infusion of prescribed dose.  Continuous ECG tracing 
was monitored during stress.  Gated SPECT imaging was performed after stress and conventional SPECT i
maging was performed at rest.  The examination was performed on a SPECT/CT scanner, both attenuation 
and non-corrected datasets were reviewed.

 

FINDINGS:     

 

DISTRIBUTION:     

The maximum perfused segment at stress is in the septal wall.

 

PERFUSION STUDY:     

There is a large fixed perfusion defect involving the entire inferolateral wall from apex to base.  T
here is no evidence of redistribution and decrease in perfusion is greater than 70% less than the bes
t perfused wall.  There is intact perfusion to the other myocardial segments.  Mild left ventricular 
dilatation.

 

GATED STUDY:     

Ejection fraction is mildly depressed at 46%.  There is intact wall motion and thickening without hyp
okinetic or dyskinetic segments. 

 

CONCLUSION:     

1. Large fixed perfusion defect involving the entire inferolateral wall characteristic of transmural 
myocardial infarction.

2. Mildly depressed ejection fraction of 46%.

 

RISK CATEGORY:     High (>3% Annual Mortality Rate)

 

 

 

 

 Richard Johnson MD on May 01, 2018 at 10:33           

Board Certified Radiologist.

 This report was verified electronically.

## 2018-05-01 NOTE — HHI.DCPOC
Discharge Care Plan


Diagnosis:  


(1) Chest pain


(2) CAD (coronary artery disease)


(3) H/O heart artery stent


(4) DM (diabetes mellitus)


(5) Hyperlipidemia


(6) Hypertension


(7) GERD (gastroesophageal reflux disease)


Goals to Promote Your Health


* To prevent worsening of your condition and complications


* To maintain your health at the optimal level


Directions to Meet Your Goals


*** Take your medications as prescribed


*** Follow your dietary instruction


*** Follow activity as directed








*** Keep your appointments as scheduled


*** Take your immunizations and boosters as scheduled


*** If your symptoms worsen call your PCP, if no PCP go to Urgent Care Center 

or Emergency Room***


*** Smoking is Dangerous to Your Health. Avoid second hand smoke***


***Call the 24-hour hour crisis hotline for domestic abuse at 1-602.993.4743***











Mike Pompa May 1, 2018 11:16

## 2024-08-05 NOTE — TR
Date Performed: 05/01/2018       Time Performed: 09:24:42

 

DOCTOR:      Khadar Oliveira 

 

DRUG LIST:     

CLINICAL HISTORY:     

REASON FOR TEST:     

REASON FOR ENDING:     

OBSERVATION:     

CONCLUSION:     

COMMENTS:      Lexiscan stress test was performed under standard four minute protocol.  Radionuclide 
was injected one minute prior to ending the test. No electrocardiographic abormalities were present t
o suggest ischemia. Nuclear imaging and interpretation are pending. No